# Patient Record
Sex: FEMALE | Race: WHITE | NOT HISPANIC OR LATINO
[De-identification: names, ages, dates, MRNs, and addresses within clinical notes are randomized per-mention and may not be internally consistent; named-entity substitution may affect disease eponyms.]

---

## 2021-01-06 DIAGNOSIS — N60.11 DIFFUSE CYSTIC MASTOPATHY OF RIGHT BREAST: ICD-10-CM

## 2021-01-06 DIAGNOSIS — N60.12 DIFFUSE CYSTIC MASTOPATHY OF LEFT BREAST: ICD-10-CM

## 2021-01-06 DIAGNOSIS — R92.2 INCONCLUSIVE MAMMOGRAM: ICD-10-CM

## 2021-01-06 PROBLEM — Z00.00 ENCOUNTER FOR PREVENTIVE HEALTH EXAMINATION: Status: ACTIVE | Noted: 2021-01-06

## 2022-04-01 DIAGNOSIS — Z86.79 PERSONAL HISTORY OF OTHER DISEASES OF THE CIRCULATORY SYSTEM: ICD-10-CM

## 2022-04-01 DIAGNOSIS — Z12.31 ENCOUNTER FOR SCREENING MAMMOGRAM FOR MALIGNANT NEOPLASM OF BREAST: ICD-10-CM

## 2022-04-05 ENCOUNTER — APPOINTMENT (OUTPATIENT)
Dept: BREAST CENTER | Facility: CLINIC | Age: 57
End: 2022-04-05
Payer: COMMERCIAL

## 2022-04-05 VITALS
OXYGEN SATURATION: 96 % | HEART RATE: 90 BPM | DIASTOLIC BLOOD PRESSURE: 77 MMHG | HEIGHT: 60 IN | SYSTOLIC BLOOD PRESSURE: 123 MMHG

## 2022-04-05 DIAGNOSIS — Z80.3 FAMILY HISTORY OF MALIGNANT NEOPLASM OF BREAST: ICD-10-CM

## 2022-04-05 DIAGNOSIS — N60.92 UNSPECIFIED BENIGN MAMMARY DYSPLASIA OF LEFT BREAST: ICD-10-CM

## 2022-04-05 PROCEDURE — 99213 OFFICE O/P EST LOW 20 MIN: CPT

## 2022-04-05 PROCEDURE — 99072 ADDL SUPL MATRL&STAF TM PHE: CPT

## 2022-04-05 NOTE — HISTORY OF PRESENT ILLNESS
[FreeTextEntry1] : 4/09 left partial mastectomy for focal atypia in a papilloma\par Maternal grandmother with breast cancer in her 80s\par [employer] model shows a 2.4% 5-year and 23.8% lifetime risk of breast cancer\par Patient refuses chemoprevention\par \par Patient denies any breast masses or nipple discharge.  Patient is overdue for her mammogram ultrasound and breast MRI.

## 2022-04-27 ENCOUNTER — NON-APPOINTMENT (OUTPATIENT)
Age: 57
End: 2022-04-27

## 2022-05-09 ENCOUNTER — NON-APPOINTMENT (OUTPATIENT)
Age: 57
End: 2022-05-09

## 2022-05-23 ENCOUNTER — NON-APPOINTMENT (OUTPATIENT)
Age: 57
End: 2022-05-23

## 2023-02-07 ENCOUNTER — RESULT REVIEW (OUTPATIENT)
Age: 58
End: 2023-02-07

## 2023-02-15 ENCOUNTER — NON-APPOINTMENT (OUTPATIENT)
Age: 58
End: 2023-02-15

## 2023-04-04 ENCOUNTER — APPOINTMENT (OUTPATIENT)
Dept: BREAST CENTER | Facility: CLINIC | Age: 58
End: 2023-04-04
Payer: COMMERCIAL

## 2023-04-04 VITALS
DIASTOLIC BLOOD PRESSURE: 66 MMHG | SYSTOLIC BLOOD PRESSURE: 116 MMHG | WEIGHT: 145 LBS | HEART RATE: 69 BPM | HEIGHT: 60 IN | BODY MASS INDEX: 28.47 KG/M2 | OXYGEN SATURATION: 98 %

## 2023-04-04 DIAGNOSIS — N64.89 OTHER SPECIFIED DISORDERS OF BREAST: ICD-10-CM

## 2023-04-04 DIAGNOSIS — R92.8 OTHER ABNORMAL AND INCONCLUSIVE FINDINGS ON DIAGNOSTIC IMAGING OF BREAST: ICD-10-CM

## 2023-04-04 PROCEDURE — 99072 ADDL SUPL MATRL&STAF TM PHE: CPT

## 2023-04-04 PROCEDURE — 99213 OFFICE O/P EST LOW 20 MIN: CPT

## 2023-04-04 NOTE — HISTORY OF PRESENT ILLNESS
[FreeTextEntry1] : 4/09 left partial mastectomy for focal atypia in a papilloma\par Maternal grandmother with breast cancer in her 80s\par [employer] model shows a 2.4% 5-year and 23.8% lifetime risk of breast cancer\par Patient refuses chemoprevention\par \par Patient denies any breast masses or nipple discharge.  Last year she had a mammogram and ultrasound which was benign except for a subcentimeter hypoechoic mass versus prominent fat lobule at the 10:00 axis of the right breast, BI-RADS 3.  MRI at that time showed linear non-mass enhancement in the posterior right breast at 12:00.  Patient underwent an MRI guided core biopsy of this area which was benign.  Recent breast MRI showed benign stability, BI-RADS 2.  Patient is due for mammogram and ultrasound at the end of the month.

## 2023-06-26 ENCOUNTER — NON-APPOINTMENT (OUTPATIENT)
Age: 58
End: 2023-06-26

## 2023-07-04 ENCOUNTER — RESULT REVIEW (OUTPATIENT)
Age: 58
End: 2023-07-04

## 2023-07-06 ENCOUNTER — NON-APPOINTMENT (OUTPATIENT)
Age: 58
End: 2023-07-06

## 2023-07-10 ENCOUNTER — RESULT REVIEW (OUTPATIENT)
Age: 58
End: 2023-07-10

## 2023-07-12 ENCOUNTER — NON-APPOINTMENT (OUTPATIENT)
Age: 58
End: 2023-07-12

## 2023-07-21 ENCOUNTER — NON-APPOINTMENT (OUTPATIENT)
Age: 58
End: 2023-07-21

## 2023-07-24 DIAGNOSIS — N64.89 OTHER SPECIFIED DISORDERS OF BREAST: ICD-10-CM

## 2023-07-24 DIAGNOSIS — D24.1 BENIGN NEOPLASM OF RIGHT BREAST: ICD-10-CM

## 2023-09-05 ENCOUNTER — APPOINTMENT (OUTPATIENT)
Dept: BREAST CENTER | Facility: CLINIC | Age: 58
End: 2023-09-05

## 2023-09-15 ENCOUNTER — NON-APPOINTMENT (OUTPATIENT)
Age: 58
End: 2023-09-15

## 2023-09-22 ENCOUNTER — APPOINTMENT (OUTPATIENT)
Dept: BREAST CENTER | Facility: HOSPITAL | Age: 58
End: 2023-09-22

## 2023-10-02 ENCOUNTER — APPOINTMENT (OUTPATIENT)
Dept: BREAST CENTER | Facility: CLINIC | Age: 58
End: 2023-10-02

## 2024-01-10 ENCOUNTER — RESULT REVIEW (OUTPATIENT)
Age: 59
End: 2024-01-10

## 2024-01-11 ENCOUNTER — APPOINTMENT (OUTPATIENT)
Dept: BREAST CENTER | Facility: CLINIC | Age: 59
End: 2024-01-11

## 2024-01-11 ENCOUNTER — APPOINTMENT (OUTPATIENT)
Dept: BREAST CENTER | Facility: CLINIC | Age: 59
End: 2024-01-11
Payer: COMMERCIAL

## 2024-01-11 VITALS
DIASTOLIC BLOOD PRESSURE: 80 MMHG | WEIGHT: 150 LBS | SYSTOLIC BLOOD PRESSURE: 127 MMHG | OXYGEN SATURATION: 99 % | BODY MASS INDEX: 29.3 KG/M2 | HEART RATE: 78 BPM

## 2024-01-11 DIAGNOSIS — Z91.89 OTHER SPECIFIED PERSONAL RISK FACTORS, NOT ELSEWHERE CLASSIFIED: ICD-10-CM

## 2024-01-11 PROCEDURE — 99213 OFFICE O/P EST LOW 20 MIN: CPT

## 2024-01-11 NOTE — HISTORY OF PRESENT ILLNESS
[FreeTextEntry1] : 4/09 left partial mastectomy for focal atypia in a papilloma Maternal grandmother with breast cancer in her 80s [employer] model shows a 2.4% 5-year and 23.8% lifetime risk of breast cancer Patient refuses chemoprevention  Patient denies any breast masses or nipple discharge.  Patient is status post a right breast core biopsy which revealed a benign papilloma.  Recent follow-up right ultrasound showed decrease in its size.

## 2024-06-25 ENCOUNTER — RESULT REVIEW (OUTPATIENT)
Age: 59
End: 2024-06-25

## 2024-07-01 ENCOUNTER — NON-APPOINTMENT (OUTPATIENT)
Age: 59
End: 2024-07-01

## 2024-08-20 ENCOUNTER — RESULT REVIEW (OUTPATIENT)
Age: 59
End: 2024-08-20

## 2024-08-21 ENCOUNTER — APPOINTMENT (OUTPATIENT)
Dept: BREAST CENTER | Facility: CLINIC | Age: 59
End: 2024-08-21
Payer: COMMERCIAL

## 2024-08-21 VITALS
DIASTOLIC BLOOD PRESSURE: 79 MMHG | WEIGHT: 147 LBS | SYSTOLIC BLOOD PRESSURE: 131 MMHG | BODY MASS INDEX: 28.86 KG/M2 | OXYGEN SATURATION: 96 % | HEART RATE: 64 BPM | HEIGHT: 60 IN

## 2024-08-21 DIAGNOSIS — N60.92 UNSPECIFIED BENIGN MAMMARY DYSPLASIA OF LEFT BREAST: ICD-10-CM

## 2024-08-21 DIAGNOSIS — Z91.89 OTHER SPECIFIED PERSONAL RISK FACTORS, NOT ELSEWHERE CLASSIFIED: ICD-10-CM

## 2024-08-21 PROCEDURE — 99213 OFFICE O/P EST LOW 20 MIN: CPT

## 2024-08-21 NOTE — HISTORY OF PRESENT ILLNESS
[FreeTextEntry1] : 4/09 left partial mastectomy for focal atypia in a papilloma Maternal grandmother with breast cancer in her 80s [employer] model shows a 2.4% 5-year and 23.8% lifetime risk of breast cancer Patient refuses chemoprevention  Patient denies any breast masses or nipple discharge.  Recently had a bilateral mammogram and ultrasound that was unremarkable, BI-RADS 2 with a ESSENCE score of 1 but the patient is already seeing her cardiologist.  Last June she had a bilateral breast MRI which was negative.  Patient is status post a right breast core biopsy which revealed a benign papilloma.  Recent follow-up right ultrasound showed decrease in its size.